# Patient Record
Sex: FEMALE | Race: ASIAN | NOT HISPANIC OR LATINO | URBAN - METROPOLITAN AREA
[De-identification: names, ages, dates, MRNs, and addresses within clinical notes are randomized per-mention and may not be internally consistent; named-entity substitution may affect disease eponyms.]

---

## 2019-07-05 ENCOUNTER — INPATIENT (INPATIENT)
Facility: HOSPITAL | Age: 34
LOS: 2 days | Discharge: HOME | End: 2019-07-08
Attending: OBSTETRICS & GYNECOLOGY | Admitting: OBSTETRICS & GYNECOLOGY

## 2019-07-05 VITALS
TEMPERATURE: 99 F | RESPIRATION RATE: 14 BRPM | SYSTOLIC BLOOD PRESSURE: 128 MMHG | DIASTOLIC BLOOD PRESSURE: 78 MMHG | HEART RATE: 105 BPM

## 2019-07-05 DIAGNOSIS — Z3A.00 WEEKS OF GESTATION OF PREGNANCY NOT SPECIFIED: ICD-10-CM

## 2019-07-05 DIAGNOSIS — O26.899 OTHER SPECIFIED PREGNANCY RELATED CONDITIONS, UNSPECIFIED TRIMESTER: ICD-10-CM

## 2019-07-05 LAB
ALBUMIN SERPL ELPH-MCNC: 3.7 G/DL — SIGNIFICANT CHANGE UP (ref 3.3–5)
ALP SERPL-CCNC: 144 U/L — HIGH (ref 40–120)
ALT FLD-CCNC: 18 U/L — SIGNIFICANT CHANGE UP (ref 4–33)
ANION GAP SERPL CALC-SCNC: 15 MMO/L — HIGH (ref 7–14)
APPEARANCE UR: SIGNIFICANT CHANGE UP
APTT BLD: 24.2 SEC — LOW (ref 27.5–36.3)
AST SERPL-CCNC: 44 U/L — HIGH (ref 4–32)
BACTERIA # UR AUTO: SIGNIFICANT CHANGE UP
BASOPHILS # BLD AUTO: 0.02 K/UL — SIGNIFICANT CHANGE UP (ref 0–0.2)
BASOPHILS NFR BLD AUTO: 0.2 % — SIGNIFICANT CHANGE UP (ref 0–2)
BILIRUB SERPL-MCNC: 0.2 MG/DL — SIGNIFICANT CHANGE UP (ref 0.2–1.2)
BILIRUB UR-MCNC: NEGATIVE — SIGNIFICANT CHANGE UP
BLOOD UR QL VISUAL: SIGNIFICANT CHANGE UP
BUN SERPL-MCNC: 8 MG/DL — SIGNIFICANT CHANGE UP (ref 7–23)
CALCIUM SERPL-MCNC: 9.4 MG/DL — SIGNIFICANT CHANGE UP (ref 8.4–10.5)
CHLORIDE SERPL-SCNC: 101 MMOL/L — SIGNIFICANT CHANGE UP (ref 98–107)
CO2 SERPL-SCNC: 18 MMOL/L — LOW (ref 22–31)
COLOR SPEC: YELLOW — SIGNIFICANT CHANGE UP
CREAT ?TM UR-MCNC: 40.7 MG/DL — SIGNIFICANT CHANGE UP
CREAT SERPL-MCNC: 0.57 MG/DL — SIGNIFICANT CHANGE UP (ref 0.5–1.3)
EOSINOPHIL # BLD AUTO: 0.04 K/UL — SIGNIFICANT CHANGE UP (ref 0–0.5)
EOSINOPHIL NFR BLD AUTO: 0.4 % — SIGNIFICANT CHANGE UP (ref 0–6)
EPI CELLS # UR: SIGNIFICANT CHANGE UP
FIBRINOGEN PPP-MCNC: 783 MG/DL — HIGH (ref 350–510)
GLUCOSE SERPL-MCNC: 76 MG/DL — SIGNIFICANT CHANGE UP (ref 70–99)
GLUCOSE UR-MCNC: NEGATIVE — SIGNIFICANT CHANGE UP
HCT VFR BLD CALC: 34.6 % — SIGNIFICANT CHANGE UP (ref 34.5–45)
HGB BLD-MCNC: 11.2 G/DL — LOW (ref 11.5–15.5)
IMM GRANULOCYTES NFR BLD AUTO: 0.4 % — SIGNIFICANT CHANGE UP (ref 0–1.5)
INR BLD: 0.92 — SIGNIFICANT CHANGE UP (ref 0.88–1.17)
KETONES UR-MCNC: SIGNIFICANT CHANGE UP
LDH SERPL L TO P-CCNC: 440 U/L — HIGH (ref 135–225)
LEUKOCYTE ESTERASE UR-ACNC: NEGATIVE — SIGNIFICANT CHANGE UP
LYMPHOCYTES # BLD AUTO: 0.98 K/UL — LOW (ref 1–3.3)
LYMPHOCYTES # BLD AUTO: 9.8 % — LOW (ref 13–44)
MCHC RBC-ENTMCNC: 27.3 PG — SIGNIFICANT CHANGE UP (ref 27–34)
MCHC RBC-ENTMCNC: 32.4 % — SIGNIFICANT CHANGE UP (ref 32–36)
MCV RBC AUTO: 84.2 FL — SIGNIFICANT CHANGE UP (ref 80–100)
MONOCYTES # BLD AUTO: 0.59 K/UL — SIGNIFICANT CHANGE UP (ref 0–0.9)
MONOCYTES NFR BLD AUTO: 5.9 % — SIGNIFICANT CHANGE UP (ref 2–14)
NEUTROPHILS # BLD AUTO: 8.28 K/UL — HIGH (ref 1.8–7.4)
NEUTROPHILS NFR BLD AUTO: 83.3 % — HIGH (ref 43–77)
NITRITE UR-MCNC: NEGATIVE — SIGNIFICANT CHANGE UP
NRBC # FLD: 0.02 K/UL — SIGNIFICANT CHANGE UP (ref 0–0)
PH UR: 6.5 — SIGNIFICANT CHANGE UP (ref 5–8)
PLATELET # BLD AUTO: 185 K/UL — SIGNIFICANT CHANGE UP (ref 150–400)
PMV BLD: 12.3 FL — SIGNIFICANT CHANGE UP (ref 7–13)
POTASSIUM SERPL-MCNC: 5.6 MMOL/L — HIGH (ref 3.5–5.3)
POTASSIUM SERPL-SCNC: 5.6 MMOL/L — HIGH (ref 3.5–5.3)
PROT SERPL-MCNC: 7.5 G/DL — SIGNIFICANT CHANGE UP (ref 6–8.3)
PROT UR-MCNC: 9 MG/DL — SIGNIFICANT CHANGE UP
PROT UR-MCNC: NEGATIVE — SIGNIFICANT CHANGE UP
PROTHROM AB SERPL-ACNC: 10.2 SEC — SIGNIFICANT CHANGE UP (ref 9.8–13.1)
RBC # BLD: 4.11 M/UL — SIGNIFICANT CHANGE UP (ref 3.8–5.2)
RBC # FLD: 14.9 % — HIGH (ref 10.3–14.5)
RBC CASTS # UR COMP ASSIST: SIGNIFICANT CHANGE UP (ref 0–?)
SODIUM SERPL-SCNC: 134 MMOL/L — LOW (ref 135–145)
SP GR SPEC: 1.01 — SIGNIFICANT CHANGE UP (ref 1–1.04)
URATE SERPL-MCNC: 6.1 MG/DL — SIGNIFICANT CHANGE UP (ref 2.5–7)
UROBILINOGEN FLD QL: NORMAL — SIGNIFICANT CHANGE UP
WBC # BLD: 9.95 K/UL — SIGNIFICANT CHANGE UP (ref 3.8–10.5)
WBC # FLD AUTO: 9.95 K/UL — SIGNIFICANT CHANGE UP (ref 3.8–10.5)

## 2019-07-05 RX ORDER — OXYTOCIN 10 UNIT/ML
VIAL (ML) INJECTION
Qty: 20 | Refills: 0 | Status: COMPLETED | OUTPATIENT
Start: 2019-07-05 | End: 2019-07-06

## 2019-07-05 RX ORDER — SODIUM CHLORIDE 9 MG/ML
1000 INJECTION, SOLUTION INTRAVENOUS
Refills: 0 | Status: DISCONTINUED | OUTPATIENT
Start: 2019-07-05 | End: 2019-07-06

## 2019-07-05 RX ADMIN — SODIUM CHLORIDE 125 MILLILITER(S): 9 INJECTION, SOLUTION INTRAVENOUS at 23:07

## 2019-07-05 NOTE — OB PROVIDER TRIAGE NOTE - NSHPLABSRESULTS_GEN_ALL_CORE
pec labs pec labs                          11.2   9.95  )-----------( 185      ( 2019 19:30 )             34.6         134<L>  |  101  |  8   ----------------------------<  76  5.6<H>   |  18<L>  |  0.57    Ca    9.4      2019 19:30    TPro  7.5  /  Alb  3.7  /  TBili  0.2  /  DBili  x   /  AST  44<H>  /  ALT  18  /  AlkPhos  144<H>            Urinalysis Basic - ( 2019 19:30 )    Color: YELLOW / Appearance: HAZY / S.006 / pH: 6.5  Gluc: NEGATIVE / Ketone: TRACE  / Bili: NEGATIVE / Urobili: NORMAL   Blood: TRACE / Protein: NEGATIVE / Nitrite: NEGATIVE   Leuk Esterase: NEGATIVE / RBC: 0-2 / WBC x   Sq Epi: x / Non Sq Epi: FEW / Bacteria: FEW      PT/INR - ( 2019 19:30 )   PT: 10.2 SEC;   INR: 0.92          PTT - ( 2019 19:30 )  PTT:24.2 SEC    CAPILLARY BLOOD GLUCOSE      protein Protein, Random Urine: 9.0 mg/dL (19 @ 19:30)    P/c ratio 0.22    Fibrinogen Assay Fibrinogen Assay: 783.0 mg/dL (19 @ 19:30)

## 2019-07-05 NOTE — OB PROVIDER TRIAGE NOTE - HISTORY OF PRESENT ILLNESS
Patient is a 33y/o  @ 39 4/7wks gestation who reports to triage, from the doctor's office to be evaluation for oligohydramnious ().  Patient also reporting contractions, pain level 4/10 on the pain scale.  Elevated B/Ps also noted in tirage. Patient denies lof/vb/h/a, n/v, visual disturbances or ruq/epigastric pain.    Denies AP Complications  Meds - pnv & iron  NKDA

## 2019-07-05 NOTE — OB PROVIDER H&P - NSHPPHYSICALEXAM_GEN_ALL_CORE
B/P Range 112-141/61-89  Abdomen gravid, soft and nontender  Continue EFM  TAS - vtx/ant plac/isma 9.5  BPP - 8/8  SVE - 2.5/50/-3 Intact  GBS - negative

## 2019-07-05 NOTE — OB PROVIDER TRIAGE NOTE - NSOBPROVIDERNOTE_OBGYN_ALL_OB_FT
Patient is a 35y/o  @ 39 4/7wks gestation who reports to triage, from the doctor's office to be evaluation for oligohydramnious ().  Patient also reporting contractions, pain level 4/10 on the pain scale.  Elevated B/Ps also noted in tirage. Patient denies lof/vb/h/a, n/v, visual disturbances or ruq/epigastric pain.    Denies AP Complications  Meds - pnv & iron  NKDA    PMH- anemia  OB  - - / - 7lbs 10oz  -ETOP X1  -SAB X 1  PSH/GYN/SH - denies    Abdomen gravid, soft and nontender  NST  TAS - vtx/ant plac/isma 9.5  BPP -   SVE - 2.5/50/-3 Intact  GBS  PEC labs sent    Discussed patient with    Plan: Patient is a 35y/o  @ 39 4/7wks gestation who reports to triage, from the doctor's office to be evaluation for oligohydramnious (AFR 2.2).  Patient also reporting contractions, pain level 4/10 on the pain scale.  Elevated B/Ps also noted in tirage. Patient denies lof/vb/h/a, n/v, visual disturbances or ruq/epigastric pain.    Denies AP Complications  Meds - pnv & iron  NKDA    PMH- anemia  OB  - - // - 7lbs 10oz  -ETOP X1  -SAB X 1  PSH/GYN/SH - denies    B/P Range 112-141/61-89  Abdomen gravid, soft and nontender  Continue EFM  TAS - vtx/ant plac/isma 9.5  BPP - /8  SVE - 2.5/50/-3 Intact  GBS - negative  PEC labs sent      Discussed patient with Dr Vail  Plan:  admit to L&D for management.

## 2019-07-05 NOTE — OB PROVIDER H&P - NSHPLABSRESULTS_GEN_ALL_CORE
pec labs                          11.2   9.95  )-----------( 185      ( 2019 19:30 )             34.6         134<L>  |  101  |  8   ----------------------------<  76  5.6<H>   |  18<L>  |  0.57    Ca    9.4      2019 19:30    TPro  7.5  /  Alb  3.7  /  TBili  0.2  /  DBili  x   /  AST  44<H>  /  ALT  18  /  AlkPhos  144<H>            Urinalysis Basic - ( 2019 19:30 )    Color: YELLOW / Appearance: HAZY / S.006 / pH: 6.5  Gluc: NEGATIVE / Ketone: TRACE  / Bili: NEGATIVE / Urobili: NORMAL   Blood: TRACE / Protein: NEGATIVE / Nitrite: NEGATIVE   Leuk Esterase: NEGATIVE / RBC: 0-2 / WBC x   Sq Epi: x / Non Sq Epi: FEW / Bacteria: FEW      PT/INR - ( 2019 19:30 )   PT: 10.2 SEC;   INR: 0.92          PTT - ( 2019 19:30 )  PTT:24.2 SEC    CAPILLARY BLOOD GLUCOSE      protein Protein, Random Urine: 9.0 mg/dL (19 @ 19:30)    P/c ratio 0.22    Fibrinogen Assay Fibrinogen Assay: 783.0 mg/dL (19 @ 19:30)

## 2019-07-05 NOTE — OB PROVIDER TRIAGE NOTE - NSHPPHYSICALEXAM_GEN_ALL_CORE
Abdomen gravid, soft and nontender  NST  TAS - vtx/ant plac/isma 9.5  BPP - 8/8  SVE - 2.5/50/-3 Intact  GBS  PEC labs sent

## 2019-07-05 NOTE — OB PROVIDER H&P - ASSESSMENT
Patient is a 33y/o  @ 39 4/7wks gestation in labor.  GBS negative.  Does not desire an epidural at this time.

## 2019-07-06 LAB
ALBUMIN SERPL ELPH-MCNC: 3.2 G/DL — LOW (ref 3.3–5)
ALP SERPL-CCNC: 121 U/L — HIGH (ref 40–120)
ALT FLD-CCNC: 14 U/L — SIGNIFICANT CHANGE UP (ref 4–33)
ANION GAP SERPL CALC-SCNC: 15 MMO/L — HIGH (ref 7–14)
APTT BLD: 26 SEC — LOW (ref 27.5–36.3)
AST SERPL-CCNC: 25 U/L — SIGNIFICANT CHANGE UP (ref 4–32)
BASOPHILS # BLD AUTO: 0.01 K/UL — SIGNIFICANT CHANGE UP (ref 0–0.2)
BASOPHILS NFR BLD AUTO: 0.1 % — SIGNIFICANT CHANGE UP (ref 0–2)
BILIRUB SERPL-MCNC: < 0.2 MG/DL — LOW (ref 0.2–1.2)
BLD GP AB SCN SERPL QL: NEGATIVE — SIGNIFICANT CHANGE UP
BUN SERPL-MCNC: 8 MG/DL — SIGNIFICANT CHANGE UP (ref 7–23)
CALCIUM SERPL-MCNC: 8.9 MG/DL — SIGNIFICANT CHANGE UP (ref 8.4–10.5)
CHLORIDE SERPL-SCNC: 99 MMOL/L — SIGNIFICANT CHANGE UP (ref 98–107)
CO2 SERPL-SCNC: 18 MMOL/L — LOW (ref 22–31)
CREAT SERPL-MCNC: 0.47 MG/DL — LOW (ref 0.5–1.3)
EOSINOPHIL # BLD AUTO: 0 K/UL — SIGNIFICANT CHANGE UP (ref 0–0.5)
EOSINOPHIL NFR BLD AUTO: 0 % — SIGNIFICANT CHANGE UP (ref 0–6)
FIBRINOGEN PPP-MCNC: 664 MG/DL — HIGH (ref 350–510)
GLUCOSE SERPL-MCNC: 121 MG/DL — HIGH (ref 70–99)
HCT VFR BLD CALC: 32.3 % — LOW (ref 34.5–45)
HGB BLD-MCNC: 10.5 G/DL — LOW (ref 11.5–15.5)
IMM GRANULOCYTES NFR BLD AUTO: 0.5 % — SIGNIFICANT CHANGE UP (ref 0–1.5)
INR BLD: 0.92 — SIGNIFICANT CHANGE UP (ref 0.88–1.17)
LDH SERPL L TO P-CCNC: 207 U/L — SIGNIFICANT CHANGE UP (ref 135–225)
LYMPHOCYTES # BLD AUTO: 0.54 K/UL — LOW (ref 1–3.3)
LYMPHOCYTES # BLD AUTO: 3.5 % — LOW (ref 13–44)
MCHC RBC-ENTMCNC: 27 PG — SIGNIFICANT CHANGE UP (ref 27–34)
MCHC RBC-ENTMCNC: 32.5 % — SIGNIFICANT CHANGE UP (ref 32–36)
MCV RBC AUTO: 83 FL — SIGNIFICANT CHANGE UP (ref 80–100)
MONOCYTES # BLD AUTO: 0.48 K/UL — SIGNIFICANT CHANGE UP (ref 0–0.9)
MONOCYTES NFR BLD AUTO: 3.1 % — SIGNIFICANT CHANGE UP (ref 2–14)
NEUTROPHILS # BLD AUTO: 14.14 K/UL — HIGH (ref 1.8–7.4)
NEUTROPHILS NFR BLD AUTO: 92.8 % — HIGH (ref 43–77)
NRBC # FLD: 0 K/UL — SIGNIFICANT CHANGE UP (ref 0–0)
PLATELET # BLD AUTO: 173 K/UL — SIGNIFICANT CHANGE UP (ref 150–400)
PMV BLD: 12.5 FL — SIGNIFICANT CHANGE UP (ref 7–13)
POTASSIUM SERPL-MCNC: 3.7 MMOL/L — SIGNIFICANT CHANGE UP (ref 3.5–5.3)
POTASSIUM SERPL-SCNC: 3.7 MMOL/L — SIGNIFICANT CHANGE UP (ref 3.5–5.3)
PROT SERPL-MCNC: 6.1 G/DL — SIGNIFICANT CHANGE UP (ref 6–8.3)
PROTHROM AB SERPL-ACNC: 10.5 SEC — SIGNIFICANT CHANGE UP (ref 9.8–13.1)
RBC # BLD: 3.89 M/UL — SIGNIFICANT CHANGE UP (ref 3.8–5.2)
RBC # FLD: 15 % — HIGH (ref 10.3–14.5)
RH IG SCN BLD-IMP: POSITIVE — SIGNIFICANT CHANGE UP
RH IG SCN BLD-IMP: POSITIVE — SIGNIFICANT CHANGE UP
SODIUM SERPL-SCNC: 132 MMOL/L — LOW (ref 135–145)
T PALLIDUM AB TITR SER: NEGATIVE — SIGNIFICANT CHANGE UP
URATE SERPL-MCNC: 6.7 MG/DL — SIGNIFICANT CHANGE UP (ref 2.5–7)
WBC # BLD: 15.24 K/UL — HIGH (ref 3.8–10.5)
WBC # FLD AUTO: 15.24 K/UL — HIGH (ref 3.8–10.5)

## 2019-07-06 RX ORDER — CARBOPROST TROMETHAMINE 250 UG/ML
250 INJECTION, SOLUTION INTRAMUSCULAR ONCE
Refills: 0 | Status: COMPLETED | OUTPATIENT
Start: 2019-07-06 | End: 2019-07-06

## 2019-07-06 RX ORDER — OXYCODONE HYDROCHLORIDE 5 MG/1
5 TABLET ORAL
Refills: 0 | Status: DISCONTINUED | OUTPATIENT
Start: 2019-07-06 | End: 2019-07-08

## 2019-07-06 RX ORDER — BENZOCAINE 10 %
1 GEL (GRAM) MUCOUS MEMBRANE EVERY 6 HOURS
Refills: 0 | Status: DISCONTINUED | OUTPATIENT
Start: 2019-07-06 | End: 2019-07-08

## 2019-07-06 RX ORDER — LANOLIN
1 OINTMENT (GRAM) TOPICAL EVERY 6 HOURS
Refills: 0 | Status: DISCONTINUED | OUTPATIENT
Start: 2019-07-06 | End: 2019-07-08

## 2019-07-06 RX ORDER — ACETAMINOPHEN 500 MG
975 TABLET ORAL
Refills: 0 | Status: DISCONTINUED | OUTPATIENT
Start: 2019-07-06 | End: 2019-07-08

## 2019-07-06 RX ORDER — ACETAMINOPHEN 500 MG
1000 TABLET ORAL ONCE
Refills: 0 | Status: COMPLETED | OUTPATIENT
Start: 2019-07-06 | End: 2019-07-06

## 2019-07-06 RX ORDER — IBUPROFEN 200 MG
600 TABLET ORAL EVERY 6 HOURS
Refills: 0 | Status: DISCONTINUED | OUTPATIENT
Start: 2019-07-06 | End: 2019-07-08

## 2019-07-06 RX ORDER — KETOROLAC TROMETHAMINE 30 MG/ML
30 SYRINGE (ML) INJECTION ONCE
Refills: 0 | Status: DISCONTINUED | OUTPATIENT
Start: 2019-07-06 | End: 2019-07-06

## 2019-07-06 RX ORDER — HYDROCORTISONE 1 %
1 OINTMENT (GRAM) TOPICAL EVERY 6 HOURS
Refills: 0 | Status: DISCONTINUED | OUTPATIENT
Start: 2019-07-06 | End: 2019-07-08

## 2019-07-06 RX ORDER — DIPHENHYDRAMINE HCL 50 MG
25 CAPSULE ORAL EVERY 6 HOURS
Refills: 0 | Status: DISCONTINUED | OUTPATIENT
Start: 2019-07-06 | End: 2019-07-08

## 2019-07-06 RX ORDER — AER TRAVELER 0.5 G/1
1 SOLUTION RECTAL; TOPICAL EVERY 4 HOURS
Refills: 0 | Status: DISCONTINUED | OUTPATIENT
Start: 2019-07-06 | End: 2019-07-08

## 2019-07-06 RX ORDER — GLYCERIN ADULT
1 SUPPOSITORY, RECTAL RECTAL AT BEDTIME
Refills: 0 | Status: DISCONTINUED | OUTPATIENT
Start: 2019-07-06 | End: 2019-07-08

## 2019-07-06 RX ORDER — SODIUM CHLORIDE 9 MG/ML
600 INJECTION, SOLUTION INTRAVENOUS ONCE
Refills: 0 | Status: COMPLETED | OUTPATIENT
Start: 2019-07-06 | End: 2019-07-06

## 2019-07-06 RX ORDER — SIMETHICONE 80 MG/1
80 TABLET, CHEWABLE ORAL EVERY 4 HOURS
Refills: 0 | Status: DISCONTINUED | OUTPATIENT
Start: 2019-07-06 | End: 2019-07-08

## 2019-07-06 RX ORDER — DIBUCAINE 1 %
1 OINTMENT (GRAM) RECTAL EVERY 6 HOURS
Refills: 0 | Status: DISCONTINUED | OUTPATIENT
Start: 2019-07-06 | End: 2019-07-08

## 2019-07-06 RX ORDER — IBUPROFEN 200 MG
600 TABLET ORAL EVERY 6 HOURS
Refills: 0 | Status: COMPLETED | OUTPATIENT
Start: 2019-07-06 | End: 2020-06-03

## 2019-07-06 RX ORDER — TETANUS TOXOID, REDUCED DIPHTHERIA TOXOID AND ACELLULAR PERTUSSIS VACCINE, ADSORBED 5; 2.5; 8; 8; 2.5 [IU]/.5ML; [IU]/.5ML; UG/.5ML; UG/.5ML; UG/.5ML
0.5 SUSPENSION INTRAMUSCULAR ONCE
Refills: 0 | Status: DISCONTINUED | OUTPATIENT
Start: 2019-07-06 | End: 2019-07-08

## 2019-07-06 RX ORDER — OXYCODONE HYDROCHLORIDE 5 MG/1
5 TABLET ORAL ONCE
Refills: 0 | Status: DISCONTINUED | OUTPATIENT
Start: 2019-07-06 | End: 2019-07-08

## 2019-07-06 RX ORDER — SODIUM CHLORIDE 9 MG/ML
3 INJECTION INTRAMUSCULAR; INTRAVENOUS; SUBCUTANEOUS EVERY 8 HOURS
Refills: 0 | Status: DISCONTINUED | OUTPATIENT
Start: 2019-07-06 | End: 2019-07-08

## 2019-07-06 RX ORDER — DOCUSATE SODIUM 100 MG
100 CAPSULE ORAL
Refills: 0 | Status: DISCONTINUED | OUTPATIENT
Start: 2019-07-06 | End: 2019-07-08

## 2019-07-06 RX ORDER — DIPHENOXYLATE HCL/ATROPINE 2.5-.025MG
2 TABLET ORAL ONCE
Refills: 0 | Status: DISCONTINUED | OUTPATIENT
Start: 2019-07-06 | End: 2019-07-06

## 2019-07-06 RX ORDER — OXYTOCIN 10 UNIT/ML
333.33 VIAL (ML) INJECTION
Qty: 20 | Refills: 0 | Status: DISCONTINUED | OUTPATIENT
Start: 2019-07-06 | End: 2019-07-06

## 2019-07-06 RX ORDER — PRAMOXINE HYDROCHLORIDE 150 MG/15G
1 AEROSOL, FOAM RECTAL EVERY 4 HOURS
Refills: 0 | Status: DISCONTINUED | OUTPATIENT
Start: 2019-07-06 | End: 2019-07-08

## 2019-07-06 RX ORDER — MAGNESIUM HYDROXIDE 400 MG/1
30 TABLET, CHEWABLE ORAL
Refills: 0 | Status: DISCONTINUED | OUTPATIENT
Start: 2019-07-06 | End: 2019-07-08

## 2019-07-06 RX ADMIN — Medication 2 TABLET(S): at 05:02

## 2019-07-06 RX ADMIN — Medication 0.25 MILLIGRAM(S): at 00:52

## 2019-07-06 RX ADMIN — Medication 975 MILLIGRAM(S): at 16:38

## 2019-07-06 RX ADMIN — SODIUM CHLORIDE 3 MILLILITER(S): 9 INJECTION INTRAMUSCULAR; INTRAVENOUS; SUBCUTANEOUS at 13:33

## 2019-07-06 RX ADMIN — Medication 1000 MILLIUNIT(S)/MIN: at 04:00

## 2019-07-06 RX ADMIN — Medication 1 TABLET(S): at 16:03

## 2019-07-06 RX ADMIN — SODIUM CHLORIDE 1200 MILLILITER(S): 9 INJECTION, SOLUTION INTRAVENOUS at 00:30

## 2019-07-06 RX ADMIN — Medication 400 MILLIGRAM(S): at 05:35

## 2019-07-06 RX ADMIN — Medication 30 MILLIGRAM(S): at 04:53

## 2019-07-06 RX ADMIN — CARBOPROST TROMETHAMINE 250 MICROGRAM(S): 250 INJECTION, SOLUTION INTRAMUSCULAR at 04:49

## 2019-07-06 RX ADMIN — SODIUM CHLORIDE 3 MILLILITER(S): 9 INJECTION INTRAMUSCULAR; INTRAVENOUS; SUBCUTANEOUS at 22:15

## 2019-07-06 RX ADMIN — Medication 1000 MILLIUNIT(S)/MIN: at 03:58

## 2019-07-06 RX ADMIN — Medication 975 MILLIGRAM(S): at 16:03

## 2019-07-06 RX ADMIN — Medication 30 MILLIGRAM(S): at 04:08

## 2019-07-06 NOTE — CHART NOTE - NSCHARTNOTEFT_GEN_A_CORE
R3 OBGYN Event Note    Pt seen at bedside for heavy vaginal bleeding.   Pt  denies dizziness,  loss of consciousness,  CP, palpitations, SOB, dyspnea. Pt saturated tonie approx 100cc. Pt had VAVD delivery w/ .    PE:  T(C): 36.8 (07-06-19 @ 03:38), Max: 37 (07-05-19 @ 16:43)  HR: 76 (07-06-19 @ 06:51) (65 - 146)  BP: 148/62 (07-06-19 @ 06:49) (108/58 - 162/90)  RR: 14 (07-06-19 @ 06:35) (14 - 14)  SpO2: 99% (07-06-19 @ 06:51) (87% - 100%)  Wt(kg): --Vital Signs Last 24 Hrs  T(C): 36.8 (06 Jul 2019 03:38), Max: 37 (05 Jul 2019 16:43)  T(F): 98.2 (06 Jul 2019 03:38), Max: 98.6 (05 Jul 2019 16:43)  HR: 76 (06 Jul 2019 06:51) (65 - 146)  BP: 148/62 (06 Jul 2019 06:49) (108/58 - 162/90)  BP(mean): 96 (06 Jul 2019 06:35) (82 - 102)  RR: 14 (06 Jul 2019 06:35) (14 - 14)  SpO2: 99% (06 Jul 2019 06:51) (87% - 100%)I&O's Detail    05 Jul 2019 07:01  -  06 Jul 2019 06:56  --------------------------------------------------------  IN:    IV PiggyBack: 1000 mL    Lactated Ringers IV Bolus: 600 mL    lactated ringers.: 437.5 mL    oxytocin Infusion: 500 mL    oxytocin Infusion: 375 mL  Total IN: 2912.5 mL    OUT:    Estimated Blood Loss: 500 mL    Intermittent Catheterization - Urethral: 400 mL    Voided: 350 mL  Total OUT: 1250 mL    Total NET: 1662.5 mL        General: NAD A+Ox3  CV: S1S2 Clear. RRR.  Pulm: CTA b/l   Abd: +BS. Soft, nondistended. Appropriately tender. Fundus firm, deviated to R  : Bimanual exploration revealed blood and clot approximately 100cc.   Extremities: Non-tender b/l, no edema.  BSS: thin EM with blood in KEREN or vagina.                          11.2   9.95  )-----------( 185      ( 05 Jul 2019 19:30 )             34.6     11.2      Plan: Pt PPD# 0 s/p VAVD now with +100.  -Cytotec 1000mcg given VT.   -Hemabate x 1 administered w/ Lomotil.  -Pt stable. Will monitor closely.    D/w Dr. Yared Ochoa PGY3

## 2019-07-06 NOTE — CHART NOTE - NSCHARTNOTEFT_GEN_A_CORE
R1 OB Progress Note    Patient seen and evaluated at bedside.  Endorses feeling rectal pressure with contractions.     T(C): 36.9 (19 @ 23:08), Max: 37 (19 @ 16:43)  HR: 73 (19 @ 00:12) (70 - 105)  BP: 112/65 (19 @ 00:09) (108/58 - 152/90)  RR: 14 (19 @ 23:08) (14 - 14)  SpO2: 100% (19 @ 00:07) (98% - 100%)    SVE: 4.5/70/-2    EFM: 115, moderate variability, + accelerations, + variable decelerations, no recurrent decelerations   Kemah:  q3-5min    A/P 34y  at 39w5d admitted for labor in the setting of oligohydramnios ()   -Labor: Continue expectant labor management  -Fetus: Category II tracing. Resuscitation with supplementary O2 and positioning to maternal side   -GBS negative  -Analgesia: Anesthesia consult PRN     Elisha Peña, PGY-1  d/w Dr. Guzmán R1 OB Progress Note    Patient seen and evaluated at bedside.  Endorses feeling rectal pressure with contractions.     T(C): 36.9 (19 @ 23:08), Max: 37 (19 @ 16:43)  HR: 73 (19 @ 00:12) (70 - 105)  BP: 112/65 (19 @ 00:09) (108/58 - 152/90)  RR: 14 (19 @ 23:08) (14 - 14)  SpO2: 100% (19 @ 00:07) (98% - 100%)    SVE: /-2, verified by Dr. Guzmán    EFM: 115, moderate variability, + accelerations, + variable decelerations, no recurrent decelerations   Ingenio:  q3-5min    A/P 34y  at 39w5d admitted for labor in the setting of oligohydramnios ()   -Labor: Continue expectant labor management  -Fetus: Category II tracing. Resuscitation with supplementary O2 and positioning to maternal side   -GBS negative  -Analgesia: Anesthesia consult PRN     Elisha Peña, PGY-1  d/w Dr. Guzmán

## 2019-07-06 NOTE — OB NEONATOLOGY/PEDIATRICIAN DELIVERY SUMMARY - NSPEDSNEONOTESA_OBGYN_ALL_OB_FT
Baby boy born @ 39.5 weeks via +vac assist to a 33 y/o Apos  mother. No significant maternal or prenatal hx.  PNL NR/immune/-.  GBS neg on . AROM at 0202 with thick meconium fluids on . Pop offs x2, nuchalx1, possible shoulder dystocia. Baby emerged vigorous with good cry.  W/d/s/s with APGARs of 9/9.  Mom desires breast feeding. No circ, no hepB.  EOS 0.06.

## 2019-07-06 NOTE — DISCHARGE NOTE OB - CARE PROVIDER_API CALL
Lavon Ball)  Obstetrics and Gynecology  [address] [place] [address]  Phone: [phone]  Fax: [phone]  Follow Up Time:

## 2019-07-06 NOTE — OB PROVIDER DELIVERY SUMMARY - NSPROVIDERDELIVERYNOTE_OBGYN_ALL_OB_FT
Resident note:  Cat 2 tracing immediately before delivery. Decision made to proceed with operative vaginal delivery. VAVD of liveborn male infant from CINDY position. Head delivered. Nuchal x 1 reduced. Shoulders and body delivered easily. Cord clamped and cut and infant handed to pediatricians. Placental delivered spontaneously intact with 3VC. Examination of the vagina revealed a 2nd deg laceration repaired with 2-0 chromic suture. pt recovered in LDR  EBL: 300

## 2019-07-06 NOTE — OB POSTPARTUM EVENT NOTE - NS_EVENTFINDINGS1_OBGYN_ALL_OB_FT
MD Ochoa at bedside to evaluate patient. 200 cc of blood/clots expressed by MD Ochoa. Hemabate 250 mcg given at 0449. Cytotec 1000mcg AR given at 0450 by MD Ochoa. Lomotil 2 tablets given at 0502. RN to notify MD if heavy bleeding persists.

## 2019-07-06 NOTE — DISCHARGE NOTE OB - PATIENT PORTAL LINK FT
You can access the [facility] Patient Portal, offered by [facility], by registering with the following website: [email or web address]

## 2019-07-06 NOTE — OB POSTPARTUM EVENT NOTE - NS_EVENTSUMMARY1_OBGYN_ALL_OB_FT
patient VAVD at 0314. pregnancy complicated by labile pressures with PIH labs WNL upon admission. Uncomplicated delivery by MD Vail with EBL of 300. VS stable. Patient denies nausea, dizziness, vomiting, and pain. Upon fundal assessment by RN, heavy bleeding noted with boggy uterus. uterus firmed with massage by RN. MD Ochoa called to labor room. No clots expressed by RN during fundal exam. LAILA Monk RN at bedside for assistance during fundal exam.

## 2019-07-07 RX ORDER — FERROUS SULFATE 325(65) MG
0 TABLET ORAL
Qty: 0 | Refills: 0 | DISCHARGE

## 2019-07-07 RX ADMIN — SODIUM CHLORIDE 3 MILLILITER(S): 9 INJECTION INTRAMUSCULAR; INTRAVENOUS; SUBCUTANEOUS at 14:19

## 2019-07-07 RX ADMIN — SODIUM CHLORIDE 3 MILLILITER(S): 9 INJECTION INTRAMUSCULAR; INTRAVENOUS; SUBCUTANEOUS at 06:20

## 2019-07-08 VITALS
SYSTOLIC BLOOD PRESSURE: 122 MMHG | TEMPERATURE: 98 F | HEART RATE: 78 BPM | RESPIRATION RATE: 17 BRPM | DIASTOLIC BLOOD PRESSURE: 76 MMHG | OXYGEN SATURATION: 100 %

## 2019-07-08 NOTE — PROGRESS NOTE ADULT - SUBJECTIVE AND OBJECTIVE BOX
S: Patient doing well. Minimal lochia. Pain controlled.  Post Partum day :  0  O: Vital Signs Last 24 Hrs  T(C): 36.8 (2019 09:15), Max: 37 (2019 16:43)  T(F): 98.3 (2019 09:15), Max: 98.6 (2019 16:43)  HR: 67 (2019 09:15) (61 - 146)  BP: 137/71 (2019 09:15) (108/58 - 162/90)  BP(mean): 95 (2019 06:50) (82 - 102)  RR: 16 (2019 09:15) (14 - 16)  SpO2: 99% (2019 09:15) (87% - 100%)    Gen: No acute distress  Abd: soft, NT,  fundus firm below umbilicus  Lochia: Normal  Ext: no tenderness    Labs:                        10.5   15.24 )-----------( 173      ( 2019 07:20 )             32.3       A: 34y PPD # 0 s/p  doing well.    Plan: Routine care
S: Patient doing well. Minimal lochia. Pain controlled.  Post Partum day : 1  O: Vital Signs Last 24 Hrs  T(C): 36.6 (2019 05:27), Max: 37.1 (2019 18:38)  T(F): 97.8 (2019 05:27), Max: 98.7 (2019 18:38)  HR: 74 (2019 05:27) (74 - 87)  BP: 131/79 (2019 05:27) (121/62 - 131/79)  BP(mean): --  RR: 17 (2019 05:27) (16 - 17)  SpO2: 98% (2019 05:27) (98% - 99%)    Gen: No acute distress  Abd: soft, NT,  fundus firm below umbilicus  Lochia : Normal  Ext: no tenderness    Labs:                        10.5   15.24 )-----------( 173      ( 2019 07:20 )             32.3       A: 34y PPD# s/p  doing well.    Plan: Discharge home tomorrow.
SUBJECTIVE:    Pain: Controlled    Complaints: None    MILESTONES:     Alert and oriented x 3  [x  ]  Out of bed /ambulating [  x]    Voiding [x  ]  Due to void [  ]    Tolerating a regular diet.    Infant feeding:   Breast [  ]   Bottle [  ]     Both [X  ]                         Feeding related issues or concerns:    Objective   T(C): 36.8 (19 @ 06:15), Max: 36.8 (19 @ 06:15)  HR: 78 (19 @ 06:15) (78 - 84)  BP: 122/76 (19 @ 06:15) (122/76 - 133/73)  RR: 17 (19 @ 06:15) (17 - 17)  SpO2: 100% (19 @ 06:15) (97% - 100%)  Wt(kg): --        Blood Type: AB Positive    RPR: Negative          MEDICATIONS  (STANDING):  acetaminophen   Tablet .. 975 milliGRAM(s) Oral <User Schedule>  diphtheria/tetanus/pertussis (acellular) Vaccine (ADAcel) 0.5 milliLiter(s) IntraMuscular once  ibuprofen  Tablet. 600 milliGRAM(s) Oral every 6 hours  prenatal multivitamin 1 Tablet(s) Oral daily  sodium chloride 0.9% lock flush 3 milliLiter(s) IV Push every 8 hours    MEDICATIONS  (PRN):  benzocaine 20%/menthol 0.5% Spray 1 Spray(s) Topical every 6 hours PRN for Perineal discomfort  dibucaine 1% Ointment 1 Application(s) Topical every 6 hours PRN Perineal discomfort  diphenhydrAMINE 25 milliGRAM(s) Oral every 6 hours PRN Pruritus  docusate sodium 100 milliGRAM(s) Oral two times a day PRN For stool softening  glycerin Suppository - Adult 1 Suppository(s) Rectal at bedtime PRN Constipation  hydrocortisone 1% Cream 1 Application(s) Topical every 6 hours PRN Moderate Pain (4-6)  lanolin Ointment 1 Application(s) Topical every 6 hours PRN nipple soreness  magnesium hydroxide Suspension 30 milliLiter(s) Oral two times a day PRN Constipation  oxyCODONE    IR 5 milliGRAM(s) Oral every 3 hours PRN Moderate to Severe Pain (4-10)  oxyCODONE    IR 5 milliGRAM(s) Oral once PRN Moderate to Severe Pain (4-10)  pramoxine 1%/zinc 5% Cream 1 Application(s) Topical every 4 hours PRN Moderate Pain (4-6)  simethicone 80 milliGRAM(s) Chew every 4 hours PRN Gas  witch hazel Pads 1 Application(s) Topical every 4 hours PRN Perineal discomfort      ASSESSMENT:      34y      G 4   P  2022      PP Day #   2    Delivery:   [  ]             [   ]        Hx. GHTN,   EBL - 300 + 200,  s/p Hemabate & Cytotec    Assisted delivery  :    Vacuum   [ X ]   Forceps)  [  ]    Indication for assisted delivery: Tracing Abn [X  ]     Maternal Exhaustion [  ]     Other [  ]    Past medical history significant for HPI:  Patient is a 33y/o  @ 39 4/7wks gestation who reports to triage, from the doctor's office to be evaluation for oligohydramnious ().  Patient also reporting contractions, pain level 4/10 on the pain scale.  Elevated B/Ps also noted in tirage. Patient denies lof/vb/h/a, n/v, visual disturbances or ruq/epigastric pain.    Denies AP Complications  Meds - pnv & iron  NKDA (2019 22:35)      Current Issues:     Breasts: Soft [x  ]    Engorged [  ]  Nipples:  Abdomen: Soft [ x ]  Nontender [  ]  Nondistended [  ]   Distended [  ]    Vagina: Lochia   Mod [x  ]      Scant [  ]  Heavy  [  ]    Perineum:  Intact [  ]   Laceration   [ X ]   Type of laceration [    2nd Degree    ]    Episiotomy [  ]    Type of episiotomy  [              ]    Lower extremities: Edema  [  ] noted bilaterally .  Nontender Robert  [  ]  Negative Aldair's sign [  ] Positive pedal pulses [  ]    Other relevant physical exam findings;      PLAN:    Plan: Increase ambulation, analgesia PRN and pain medication  protocol standing oxycodone, ibuprofen and acetaminophen.    Diet: Continue regular diet    Labs:    Continue routine postpartum care.     Discharge Planning [x  ]    For  Discharge Today  [ X  ]    Consults :  Social Work [  ]     Lactation [x  ]    Other [      ]

## 2023-05-15 NOTE — OB RN PATIENT PROFILE - NS PRO TDAP PRECAUTIONS
Pt presents to the ED via EMS. Pt was found intoxicated at the  hotel. Pt reports drinking for one week. Pt was incontinent of bowel and bladder on scene. 'pt has stool on foot. Pt is cooperative tearful. Pt has small abrasion on left elbow.     Debbie Means RN on 5/15/2023 at 2:13 PM       Triage Assessment     Row Name 05/15/23 1410       Skin Circulation/Temperature WDL    Skin Circulation/Temperature WDL WDL       Cognitive/Neuro/Behavioral WDL    Cognitive/Neuro/Behavioral WDL X    Level of Consciousness alert    Arousal Level opens eyes spontaneously    Speech slurred    Mood/Behavior cooperative;labile              
no precautions noted

## 2023-06-23 NOTE — OB POSTPARTUM EVENT NOTE - NS_EVENTPTSUMMARY1_OBGYN_ALL_OB_FT
Meds:  No change    Tests:  XR prior to next appointment    Other:  May wean out of cervical collar starting 8/1/23 and start physical therapy- ok to not wear at night    Follow Up:  In office in 6 weeks for 12 week post appointment   
VS at 0438 include /66, HR 80, RR 14, and oxygen saturation 99%. Patient unable to void at this time.

## 2024-12-20 NOTE — OB RN PATIENT PROFILE - NS_VBACATTEMPT_OBGYN_ALL_OB
Take zofran every 6 hours as needed for nausea and vomiting.   Take tamiflu 2 x a day for 5 days.   Continue to encourage fluid intake for goal of urination at least 4 x a day  Continue alternating tylenol and motrin every 3 hours as needed for fever/pain  Saline nose spray/suction as needed  Humidifier in bedroom   Honey as needed for cough  Return if fever last >5 days.   If symptoms worsen or fail to improve, please call/return to clinic   N/A

## 2025-07-02 NOTE — OB RN DELIVERY SUMMARY - BABY A: APGAR 1 MIN COLOR, DELIVERY
----- Message from Jackie PERSAUD RN sent at 6/25/2025  8:45 AM CDT -----  Regarding: Home Monitor INR  Damon \"Eric\" will obtain INR on home monitor on Wednesday 7/2/25.  This is a 1 week re-check.   (1) body pink, extremities blue